# Patient Record
Sex: MALE | Race: WHITE | NOT HISPANIC OR LATINO | URBAN - NONMETROPOLITAN AREA
[De-identification: names, ages, dates, MRNs, and addresses within clinical notes are randomized per-mention and may not be internally consistent; named-entity substitution may affect disease eponyms.]

---

## 2019-06-21 ENCOUNTER — HOSPITAL ENCOUNTER (EMERGENCY)
Facility: HOSPITAL | Age: 24
Discharge: HOME | End: 2019-06-22
Admitting: EMERGENCY MEDICINE

## 2019-06-21 ENCOUNTER — NURSE TRIAGE (OUTPATIENT)
Dept: CALL CENTER | Facility: HOSPITAL | Age: 24
End: 2019-06-21

## 2019-06-21 VITALS
WEIGHT: 168 LBS | DIASTOLIC BLOOD PRESSURE: 69 MMHG | TEMPERATURE: 98.2 F | HEART RATE: 84 BPM | HEIGHT: 72 IN | BODY MASS INDEX: 22.75 KG/M2 | RESPIRATION RATE: 19 BRPM | SYSTOLIC BLOOD PRESSURE: 119 MMHG | OXYGEN SATURATION: 99 %

## 2019-06-21 DIAGNOSIS — L02.91 ABSCESS: Primary | ICD-10-CM

## 2019-06-21 PROCEDURE — 99283 EMERGENCY DEPT VISIT LOW MDM: CPT

## 2019-06-22 LAB
HOLD SPECIMEN: NORMAL
HOLD SPECIMEN: NORMAL
WHOLE BLOOD HOLD SPECIMEN: NORMAL
WHOLE BLOOD HOLD SPECIMEN: NORMAL

## 2019-06-22 RX ORDER — SULFAMETHOXAZOLE AND TRIMETHOPRIM 800; 160 MG/1; MG/1
1 TABLET ORAL 2 TIMES DAILY
Qty: 20 TABLET | Refills: 0 | Status: SHIPPED | OUTPATIENT
Start: 2019-06-22 | End: 2021-10-04

## 2019-06-22 RX ORDER — ACETAMINOPHEN 500 MG
500 TABLET ORAL EVERY 6 HOURS PRN
Qty: 21 TABLET | Refills: 0 | Status: SHIPPED | OUTPATIENT
Start: 2019-06-22 | End: 2021-10-04

## 2019-06-22 RX ORDER — ETODOLAC 200 MG/1
200 CAPSULE ORAL EVERY 8 HOURS
Qty: 15 CAPSULE | Refills: 0 | Status: SHIPPED | OUTPATIENT
Start: 2019-06-22 | End: 2021-10-04

## 2019-06-22 NOTE — TELEPHONE ENCOUNTER
"Caller states significant other was seen in ER and had what is described as a I&D yesterday. Caller states having pain of 6 to 10 with movement. Caller denies fever at this time and states was given ABX but no pain medication. Caller advised per guideline. Caller states she is going to [place] around area to monitor as well.     Reason for Disposition  • [1] SEVERE pain (e.g., excruciating, unable to do any normal activities) AND [2] not improved after 2 hours of pain medicine    Additional Information  • Negative: Looks like a broken bone or dislocated joint (e.g., crooked or deformed)  • Negative: Sounds like a life-threatening emergency to the triager  • Negative: Followed a leg injury  • Negative: Leg swelling is main symptom  • Negative: Back pain radiating (shooting) into leg(s)  • Negative: Knee pain is main symptom  • Negative: Ankle pain is main symptom  • Negative: Pregnant  • Negative: Chest pain  • Negative: Difficulty breathing  • Negative: Entire foot is cool or blue in comparison to other side  • Negative: Unable to walk  • Negative: [1] Red area or streak AND [2] fever  • Negative: [1] Swollen joint AND [2] fever  • Negative: [1] Cast on leg or ankle AND [2] now increased pain  • Negative: Patient sounds very sick or weak to the triager    Answer Assessment - Initial Assessment Questions  1. ONSET: \"When did the pain start?\"       Tonight   2. LOCATION: \"Where is the pain located?\"        Right leg upper thigh   3. PAIN: \"How bad is the pain?\"    (Scale 1-10; or mild, moderate, severe)    -  MILD (1-3): doesn't interfere with normal activities     -  MODERATE (4-7): interferes with normal activities (e.g., work or school) or awakens from sleep, limping     -  SEVERE (8-10): excruciating pain, unable to do any normal activities, unable to walk      6 with movement about 10  4. WORK OR EXERCISE: \"Has there been any recent work or exercise that involved this part of the body?\"       Denies   5. CAUSE: " "\"What do you think is causing the leg pain?\"      They cut open this area last night in the ER and drained it   6. OTHER SYMPTOMS: \"Do you have any other symptoms?\" (e.g., chest pain, back pain, breathing difficulty, swelling, rash, fever, numbness, weakness)      Denies   7. PREGNANCY: \"Is there any chance you are pregnant?\" \"When was your last menstrual period?\"      N/a    Protocols used: LEG PAIN-ADULT-AH      "

## 2021-08-30 ENCOUNTER — OFFICE VISIT (OUTPATIENT)
Dept: FAMILY MEDICINE CLINIC | Age: 26
End: 2021-08-30

## 2021-08-30 VITALS
WEIGHT: 153 LBS | SYSTOLIC BLOOD PRESSURE: 108 MMHG | BODY MASS INDEX: 20.72 KG/M2 | HEART RATE: 65 BPM | TEMPERATURE: 98.7 F | DIASTOLIC BLOOD PRESSURE: 66 MMHG | HEIGHT: 72 IN | OXYGEN SATURATION: 99 %

## 2021-08-30 DIAGNOSIS — Z11.3 ROUTINE SCREENING FOR STI (SEXUALLY TRANSMITTED INFECTION): ICD-10-CM

## 2021-08-30 DIAGNOSIS — R19.7 DIARRHEA, UNSPECIFIED TYPE: ICD-10-CM

## 2021-08-30 DIAGNOSIS — R10.31 RIGHT INGUINAL PAIN: ICD-10-CM

## 2021-08-30 DIAGNOSIS — G89.29 CHRONIC RIGHT-SIDED LOW BACK PAIN, UNSPECIFIED WHETHER SCIATICA PRESENT: Primary | ICD-10-CM

## 2021-08-30 DIAGNOSIS — M54.50 CHRONIC RIGHT-SIDED LOW BACK PAIN, UNSPECIFIED WHETHER SCIATICA PRESENT: Primary | ICD-10-CM

## 2021-08-30 PROCEDURE — 99204 OFFICE O/P NEW MOD 45 MIN: CPT | Performed by: FAMILY MEDICINE

## 2021-08-30 ASSESSMENT — PATIENT HEALTH QUESTIONNAIRE - PHQ9
1. LITTLE INTEREST OR PLEASURE IN DOING THINGS: 0
SUM OF ALL RESPONSES TO PHQ QUESTIONS 1-9: 0
SUM OF ALL RESPONSES TO PHQ9 QUESTIONS 1 & 2: 0
2. FEELING DOWN, DEPRESSED OR HOPELESS: 0
SUM OF ALL RESPONSES TO PHQ QUESTIONS 1-9: 0
SUM OF ALL RESPONSES TO PHQ QUESTIONS 1-9: 0

## 2021-08-30 NOTE — PATIENT INSTRUCTIONS
\"lactose-free. \") You can have small amounts (2 Tbsp) of cottage, cream, or ricotta cheese. Hard cheeses like cheddar, [place], ROSS, and Swiss are okay. So are small amounts (1 oz) of aged or ripened cheeses like Brie, blue, and feta. Avoid: Milk, including cow, goat, and sheep. Avoid condensed or evaporated milk, buttermilk, custard, cream, sour cream, yogurt, and ice cream. Avoid soy milk. (Check sauces for dairy ingredients.)  Vegetables  Okay to eat: Bamboo shoots, bell peppers, bok manasa, up to ½ cup of broccoli or cabbage (red or white), and cucumbers. Eggplant, green beans, lettuce, olives, parsnips, and potatoes are okay to eat. So are pumpkin, rutabaga, seaweed, sprouts, Swiss chard, and spinach. You can eat scallions (green part only) and squash (not butternut). You can eat tomatoes, turnips, watercress, yams, and zucchini. You can also have small amounts of artichoke hearts (from can, 1 oz), carrots, corn (½ cob), and sweet potato (½ cup). Avoid: Artichokes, asparagus, [place] sprouts, tressa cabbage, cauliflower, and celery. And avoid garlic, leeks, mushrooms, okra, onions, scallions (white part), shallots, and peas. Fruits  Okay to eat: Bananas, blueberries, cantaloupe, coconut, grapes, and honeydew. Kiwi, linda, limes, oranges, passion fruit, papaya, and pineapple are also okay. You can eat plantain, raspberries, rhubarb, star fruit, strawberries, tangelo, and tangerine. You can also have small amounts of dried banana chips (up to 10 chips), dried cranberries (1 Tbsp), and shredded coconut (up to ¼ cup). Avoid: Apples, applesauce, apricots, avocados, blackberries, boysenberries, and cherries. Also avoid dates, figs, grapefruit, guava, lychee, and mangoes. Don't eat nectarines, peaches, pears, persimmon, plums, prunes, tamarillo, or watermelon. And limit most canned and dried fruits.   Oils, spices, condiments, and sweeteners  Okay to eat: Vegetable oils (including garlic infused), butter, ghee, lard, and margarine (no trans fat). You can have most fresh herbs like basil, chives, coriander, nayely, parsley, rosemary and thyme. You can have salt, jams made from low-FODMAP fruits, mayonnaise, and mustard. Soy sauce, hot sauce (no garlic), tamari, and vinegar are also okay. Sweeteners that are okay include sugar (sucrose), powdered (confectioner's) sugar, brown sugar, glucose, and maple syrup. You can also have some artificial sweeteners like aspartame, saccharine, and stevia. Avoid: Chutneys, hummus, jellies, garlic sauces, and gravies made with onion or garlic. Avoid pickles, relish, some salad dressings and soup stocks, salsa, and tomato paste. And avoid sauces and other foods with high fructose corn syrup, honey, molasses, and agave. Avoid artificial sweeteners (isomalt, mannitol, malitol, sorbitol, and xylitol). Avoid corn syrup solids, fructose, fruit juice concentrate, and polydextrose. Other foods and drinks  Okay to have: Water, soda water, tonic, soft drinks sweetened with sugar, ½ cup of low-FODMAP fruit juice, and most teas and alcohols. You can also eat foods made with baking powder and soda, cocoa, and gelatin. Avoid: Juices from high-FODMAP fruits and vegetables. And avoid fortified cosmo, chamomile and fennel teas, chicory-based drinks and coffee substitutes, and bouillon cubes. Follow-up care is a key part of your treatment and safety. Be sure to make and go to all appointments, and call your doctor if you are having problems. It's also a good idea to know your test results and keep a list of the medicines you take. Where can you learn more? Go to https://chloe.[employer]. org and sign in to your [employer]! account. Enter L235 in the [employer] box to learn more about \"Learning About the Low FODMAP Diet for Irritable Bowel Syndrome (IBS). \"     If you do not have an account, please click on the \"Sign Up Now\" link.   Current as of: December 17, 2020               Content Version: 12.9  © 3933-5854 [employer], Incorporated. Care instructions adapted under license by [address] If you have questions about a medical condition or this instruction, always ask your healthcare professional. [address] any warranty or liability for your use of this information.

## 2021-08-30 NOTE — PROGRESS NOTES
Lluvia QUEEN [address]  Dept: [phone]  Dept Fax: [phone]    Visit type: New patient    Reason for Visit: Establish Care, Blood Work, and Diarrhea         Assessment and Plan       1. Chronic right-sided low back pain, unspecified whether sciatica present  2. Right inguinal pain  -     US SCROTUM AND TESTICLES; Future  3. Diarrhea, unspecified type  -     CBC Auto Differential; Future  -     Comprehensive Metabolic Panel; Future  -     TSH without Reflex; Future  4. Routine screening for STI (sexually transmitted infection)  -     RPR Reflex to Titer and TPPA; Future  -     Herpes Simplex virus (HSV) I/II Antibodies IgG & IgM w Reflex; Future  -     HIV Screen; Future    Discussed potential culprits of his back pain and the possibility of further evaluation with an ultrasound and patient was agreeable. Discussed dietary and lifestyle modifications that may beneficial for symptoms including his diarrhea and at this time will attempt a fiber supplement with information concerning the FODMAP diet provided. Discussed STD testing. Discussed safe sex practices. Discussed signs symptoms require medical attention. All questions were answered and patient voiced understanding and agreement plan as discussed. Return if symptoms worsen or fail to improve, for next scheduled follow up with PCP. Subjective       HPI   Has been having low back pain and states that he will occasionally have pain going into his groin at times and did have a history of right inguinal hernia that he had fixed with mesh and doesn't know if that is involved. He states that has been going on for about 3 years and seems to come and go. He states that diarrhea is always been a problem for him and denies any dietary changes or contributing factors to his diarrhea.   He states that he does have about 4-5 episodes of diarrhea a day and it does seem to be associated with eating to some extent because fasting does help to decrease the total number of bowel movements per day but still has few episodes of diarrhea even if he is not eating as much and denies any specific contributing factors. He states that he has been seen for this before and was asked to \" poop in a cup\" but never did but never took it back. Patient does state that he is interested in STD testing. Denies any specific problems or issues at this time. Denies any rashes or outbreaks in his groin area. Review of Systems   Constitutional: Negative for activity change, appetite change, fatigue and fever. HENT: Negative for congestion, rhinorrhea and sore throat. Eyes: Negative for pain and discharge. Respiratory: Negative for cough, shortness of breath and wheezing. Cardiovascular: Negative for chest pain and palpitations. Gastrointestinal: Positive for diarrhea. Negative for abdominal pain, constipation, nausea and vomiting. Endocrine: Negative for cold intolerance and heat intolerance. Genitourinary: Positive for testicular pain. Negative for dysuria and hematuria. Musculoskeletal: Positive for back pain. Negative for gait problem and neck pain. Skin: Negative for rash and wound. Neurological: Negative for syncope and weakness. Hematological: Negative for adenopathy. Does not bruise/bleed easily. Psychiatric/Behavioral: Negative for dysphoric mood and sleep disturbance. The patient is not nervous/anxious. No Known Allergies    No outpatient medications prior to visit. No facility-administered medications prior to visit. No past medical history on file.      Social History     Tobacco Use    Smoking status: Current Every Day Smoker     Packs/day: 0.25     Types: Cigarettes     Start date: 8/23/2010    Smokeless tobacco: Never Used   Substance Use Topics    Alcohol use: Not on file        Past Surgical History:   Procedure Laterality Date    HERNIA REPAIR Right        No family history on file.    Objective       /66 (Site: Right Upper Arm, Position: Sitting, Cuff Size: Large Adult)   Pulse 65   Temp 98.7 °F (37.1 °C) (Temporal)   Ht 6' (1.829 m)   Wt 153 lb (69.4 kg)   SpO2 99%   BMI 20.75 kg/m²   Physical Exam  Vitals and nursing note reviewed. Constitutional:       General: He is not in acute distress. Appearance: Normal appearance. He is well-developed. He is not diaphoretic. HENT:      Head: Normocephalic and atraumatic. Right Ear: External ear normal.      Left Ear: External ear normal.      Mouth/Throat:      Comments: Mask in place  Eyes:      General: No scleral icterus. Right eye: No discharge. Left eye: No discharge. Conjunctiva/sclera: Conjunctivae normal.   Neck:      Trachea: No tracheal deviation. Cardiovascular:      Rate and Rhythm: Normal rate and regular rhythm. Heart sounds: Normal heart sounds. No murmur heard. No friction rub. No gallop. Pulmonary:      Effort: Pulmonary effort is normal. No respiratory distress. Breath sounds: Normal breath sounds. No wheezing or rales. Abdominal:      General: Bowel sounds are normal. There is no distension. Palpations: Abdomen is soft. Tenderness: There is no abdominal tenderness. There is no guarding. Hernia: No hernia is present. Genitourinary:     Testes: Normal.   Musculoskeletal:         General: No deformity (No gross deformities of upper or lower extremities) or signs of injury. Normal range of motion. Cervical back: Normal range of motion and neck supple. No muscular tenderness. Right lower leg: No edema. Left lower leg: No edema. Lymphadenopathy:      Cervical: No cervical adenopathy. Skin:     General: Skin is warm and dry. Findings: No erythema or rash. Neurological:      Mental Status: He is alert and oriented to person, place, and time. Cranial Nerves: No cranial nerve deficit.    Psychiatric:         Mood and Affect: Mood normal.         Behavior: Behavior normal.         Thought Content:  Thought content normal.           Data Reviewed and Summarized       Labs:     Imaging/Testing:        Bebeto Neri MD

## 2021-09-03 ENCOUNTER — HOSPITAL ENCOUNTER (OUTPATIENT)
Dept: ULTRASOUND IMAGING | Age: 26
Discharge: HOME | End: 2021-09-03

## 2021-09-03 DIAGNOSIS — R10.31 RIGHT INGUINAL PAIN: ICD-10-CM

## 2021-09-03 PROCEDURE — 76870 US EXAM SCROTUM: CPT

## 2021-09-06 ASSESSMENT — ENCOUNTER SYMPTOMS
EYE PAIN: 0
WHEEZING: 0
BACK PAIN: 1
DIARRHEA: 1
SORE THROAT: 0
NAUSEA: 0
ABDOMINAL PAIN: 0
RHINORRHEA: 0
CONSTIPATION: 0
EYE DISCHARGE: 0
SHORTNESS OF BREATH: 0
VOMITING: 0
COUGH: 0

## 2021-09-27 ENCOUNTER — TELEPHONE (OUTPATIENT)
Dept: FAMILY MEDICINE CLINIC | Facility: CLINIC | Age: 26
End: 2021-09-27

## 2021-09-27 NOTE — TELEPHONE ENCOUNTER
Caller: HELEN MULLER     Relationship to patient: Spouse    Best call back number: [phone]    HELEN REQUESTING THAT INITIAL ADHD PACKET AND BH VERBAL RELEASE FORM BE READY FOR PATIENT TO PICK-UP ON Saturday 09/02/2021

## 2021-10-04 ENCOUNTER — OFFICE VISIT (OUTPATIENT)
Dept: FAMILY MEDICINE CLINIC | Facility: CLINIC | Age: 26
End: 2021-10-04

## 2021-10-04 VITALS
BODY MASS INDEX: 21.4 KG/M2 | RESPIRATION RATE: 20 BRPM | WEIGHT: 158 LBS | DIASTOLIC BLOOD PRESSURE: 62 MMHG | HEIGHT: 72 IN | TEMPERATURE: 98.5 F | SYSTOLIC BLOOD PRESSURE: 104 MMHG | HEART RATE: 56 BPM

## 2021-10-04 DIAGNOSIS — F31.9 BIPOLAR 1 DISORDER (HCC): Primary | ICD-10-CM

## 2021-10-04 DIAGNOSIS — G47.00 INSOMNIA DISORDER RELATED TO KNOWN ORGANIC FACTOR: ICD-10-CM

## 2021-10-04 DIAGNOSIS — F90.2 ATTENTION DEFICIT HYPERACTIVITY DISORDER (ADHD), COMBINED TYPE: ICD-10-CM

## 2021-10-04 PROCEDURE — 99204 OFFICE O/P NEW MOD 45 MIN: CPT | Performed by: PEDIATRICS

## 2021-10-04 RX ORDER — LAMOTRIGINE 25 MG/1
TABLET ORAL
Qty: 42 TABLET | Refills: 0 | Status: SHIPPED | OUTPATIENT
Start: 2021-10-04 | End: 2021-11-01

## 2021-10-04 RX ORDER — QUETIAPINE FUMARATE 50 MG/1
50 TABLET, FILM COATED ORAL NIGHTLY
Qty: 30 TABLET | Refills: 2 | Status: SHIPPED | OUTPATIENT
Start: 2021-10-04

## 2021-10-04 NOTE — PROGRESS NOTES
"Chief Complaint  ADHD (initial)    Subjective    History of Present Illness      Patient presents to [facility] for   Pt states that he is here for an initial ADHD evaluation.        ADHD/Mood HPI    Visit for:  initial evaluation  Interim changes to follow up on today: new medication:   Work/School Performance:  Pt does note work  Cognitive:  unable to focus    Behavior  Hyperactivity: is not hyperactive  Impulsivity: impulsive  Tasking: difficulty initiating tasks and difficulty completing tasks    Social  ADHD social/impulsive symptoms:  has difficulty awaiting turn    Behavioral health  Behavior: anxious behavior  Emotional coping: demonstrates feelings of anxiety    He is having issues with extreme mood issues self medication.  He has periods of narcisa followed by depression.   His parents are very mentally ill.       Review of Systems    I have reviewed and agree with the HPI information as above.  Omari Nichole MD     Objective   Vital Signs:   /62   Pulse 56   Temp 98.5 °F (36.9 °C)   Resp 20   Ht 182.9 cm (72\")   Wt 71.7 kg (158 lb)   BMI 21.43 kg/m²       Physical Exam  Nursing note reviewed: very tattoo'd    Constitutional:       Appearance: Normal appearance. He is normal weight.   Cardiovascular:      Rate and Rhythm: Normal rate and regular rhythm.      Heart sounds: Normal heart sounds.   Pulmonary:      Effort: Pulmonary effort is normal.      Breath sounds: Normal breath sounds.   Neurological:      Mental Status: He is alert.   Psychiatric:         Mood and Affect: Mood normal.         Behavior: Behavior normal.          Result Review  Data Reviewed:                   Assessment and Plan      Problem List Items Addressed This Visit        Mental Health    Bipolar 1 disorder (HCC) - Primary    Current Assessment & Plan       Very positive mdq and past history of his life is text book.  Will start low dose mood stabilizer and seroquel 50 hs  See in one " month         Relevant Medications    QUEtiapine (SEROquel) 50 MG tablet    lamoTRIgine (LaMICtal) 25 MG tablet    Attention deficit hyperactivity disorder (ADHD), combined type    Current Assessment & Plan       Currently has symptoms of adhd but not the real diagnosis.  His racing brain and thought appear to show adhd but stimulant would increase his manic issues in my opinion         Relevant Medications    QUEtiapine (SEROquel) 50 MG tablet       Sleep    Insomnia disorder related to known organic factor    Current Assessment & Plan     Mind racing insomnia, he self medicates          Relevant Medications    QUEtiapine (SEROquel) 50 MG tablet              Follow Up   Return in about 4 weeks (around 11/1/2021) for Recheck.  Patient was given instructions and counseling regarding his condition or for health maintenance advice. Please see specific information pulled into the AVS if appropriate.

## 2021-10-04 NOTE — ASSESSMENT & PLAN NOTE
Currently has symptoms of adhd but not the real diagnosis.  His racing brain and thought appear to show adhd but stimulant would increase his manic issues in my opinion

## 2021-10-04 NOTE — ASSESSMENT & PLAN NOTE
Very positive mdq and past history of his life is text book.  Will start low dose mood stabilizer and seroquel 50 hs  See in one month

## 2021-11-11 ENCOUNTER — TRANSCRIBE ORDERS (OUTPATIENT)
Dept: ADMINISTRATIVE | Facility: HOSPITAL | Age: 26
End: 2021-11-11

## 2021-11-11 ENCOUNTER — LAB (OUTPATIENT)
Dept: LAB | Facility: HOSPITAL | Age: 26
End: 2021-11-11

## 2021-11-11 DIAGNOSIS — Z31.41 FERTILITY TESTING: ICD-10-CM

## 2021-11-11 DIAGNOSIS — Z31.41 FERTILITY TESTING: Primary | ICD-10-CM

## 2021-11-11 LAB
CHARACTER SMN: ABNORMAL
COLLECTION METHOD SMN: ABNORMAL
EPI CELLS #/AREA SMN HPF: ABNORMAL /HPF
LIQUEFACTION TIME SMN: NORMAL MIN
NON PROGRESSIVE MOTILITY: 34 %
RBC #/AREA SMN HPF: ABNORMAL /HPF
SEX ABSTIN DURATION TIME PATIENT: 3 DAYS
SLOW PROGRESSIVE MOTILITY: 18 %
SMI: 25 (ref 80–400)
SPEC CONTAINER SPEC: ABNORMAL
SPECIMEN VOL SMN: 3.5 ML
SPERM # SMN: 10 MILLIONS/ML
SPERM IMMOTILE NFR SMN: 37 %
SPERM MOTILE NFR SMN: 63 % MOTILE (ref 50–100)
SPERM PROG NFR SMN: 11 % (ref 25–100)
SPERM SMN: 6.7 % NORMAL (ref 15–100)
VISC SMN: NORMAL CP
WBC SMN QL: ABNORMAL /HPF
WHO STANDARD: ABNORMAL

## 2021-11-11 PROCEDURE — 89320 SEMEN ANAL VOL/COUNT/MOT: CPT
